# Patient Record
Sex: FEMALE | Race: WHITE | Employment: OTHER | URBAN - METROPOLITAN AREA
[De-identification: names, ages, dates, MRNs, and addresses within clinical notes are randomized per-mention and may not be internally consistent; named-entity substitution may affect disease eponyms.]

---

## 2017-12-14 PROBLEM — N39.41 URGE INCONTINENCE: Status: ACTIVE | Noted: 2017-12-14

## 2019-08-21 ENCOUNTER — OFFICE VISIT (OUTPATIENT)
Dept: HEMATOLOGY | Age: 39
End: 2019-08-21

## 2019-08-21 ENCOUNTER — HOSPITAL ENCOUNTER (OUTPATIENT)
Dept: LAB | Age: 39
Discharge: HOME | End: 2019-08-21
Payer: MEDICARE

## 2019-08-21 VITALS
OXYGEN SATURATION: 95 % | HEIGHT: 64 IN | WEIGHT: 200.13 LBS | HEART RATE: 97 BPM | DIASTOLIC BLOOD PRESSURE: 82 MMHG | TEMPERATURE: 98.4 F | BODY MASS INDEX: 34.17 KG/M2 | SYSTOLIC BLOOD PRESSURE: 116 MMHG

## 2019-08-21 DIAGNOSIS — R74.8 ELEVATED LIVER ENZYMES: ICD-10-CM

## 2019-08-21 DIAGNOSIS — R74.8 ELEVATED LIVER ENZYMES: Primary | ICD-10-CM

## 2019-08-21 DIAGNOSIS — Z11.59 ENCOUNTER FOR SCREENING FOR OTHER VIRAL DISEASES: ICD-10-CM

## 2019-08-21 PROBLEM — Z90.49 S/P CHOLECYSTECTOMY: Status: ACTIVE | Noted: 2019-08-21

## 2019-08-21 PROBLEM — E03.9 ACQUIRED HYPOTHYROIDISM: Status: ACTIVE | Noted: 2019-08-21

## 2019-08-21 PROBLEM — F31.9 BIPOLAR DISORDER (HCC): Status: ACTIVE | Noted: 2019-08-21

## 2019-08-21 PROBLEM — J45.909 ASTHMA: Status: ACTIVE | Noted: 2019-08-21

## 2019-08-21 PROBLEM — K31.84 GASTROPARESIS: Status: ACTIVE | Noted: 2019-08-21

## 2019-08-21 PROBLEM — E78.5 DYSLIPIDEMIA: Status: ACTIVE | Noted: 2019-08-21

## 2019-08-21 PROBLEM — E11.9 DM TYPE 2 (DIABETES MELLITUS, TYPE 2) (HCC): Status: ACTIVE | Noted: 2019-08-21

## 2019-08-21 LAB
ALBUMIN SERPL-MCNC: 4 G/DL (ref 3.4–5)
ALBUMIN/GLOB SERPL: 1.2 {RATIO} (ref 0.8–1.7)
ALP SERPL-CCNC: 64 U/L (ref 45–117)
ALT SERPL-CCNC: 30 U/L (ref 13–56)
ANION GAP SERPL CALC-SCNC: 10 MMOL/L (ref 3–18)
AST SERPL-CCNC: 16 U/L (ref 10–38)
BASOPHILS # BLD: 0 K/UL (ref 0–0.1)
BASOPHILS NFR BLD: 1 % (ref 0–2)
BILIRUB DIRECT SERPL-MCNC: 0.1 MG/DL (ref 0–0.2)
BILIRUB SERPL-MCNC: 0.3 MG/DL (ref 0.2–1)
BUN SERPL-MCNC: 15 MG/DL (ref 7–18)
BUN/CREAT SERPL: 20 (ref 12–20)
CALCIUM SERPL-MCNC: 9.2 MG/DL (ref 8.5–10.1)
CHLORIDE SERPL-SCNC: 106 MMOL/L (ref 100–111)
CO2 SERPL-SCNC: 27 MMOL/L (ref 21–32)
CREAT SERPL-MCNC: 0.75 MG/DL (ref 0.6–1.3)
DIFFERENTIAL METHOD BLD: NORMAL
EOSINOPHIL # BLD: 0.2 K/UL (ref 0–0.4)
EOSINOPHIL NFR BLD: 2 % (ref 0–5)
ERYTHROCYTE [DISTWIDTH] IN BLOOD BY AUTOMATED COUNT: 13.6 % (ref 11.6–14.5)
FERRITIN SERPL-MCNC: 54 NG/ML (ref 8–388)
GLOBULIN SER CALC-MCNC: 3.4 G/DL (ref 2–4)
GLUCOSE SERPL-MCNC: 79 MG/DL (ref 74–99)
HCT VFR BLD AUTO: 42.8 % (ref 35–45)
HGB BLD-MCNC: 14.3 G/DL (ref 12–16)
IRON SATN MFR SERPL: 10 %
IRON SERPL-MCNC: 48 UG/DL (ref 50–175)
LYMPHOCYTES # BLD: 2.3 K/UL (ref 0.9–3.6)
LYMPHOCYTES NFR BLD: 30 % (ref 21–52)
MCH RBC QN AUTO: 30.3 PG (ref 24–34)
MCHC RBC AUTO-ENTMCNC: 33.4 G/DL (ref 31–37)
MCV RBC AUTO: 90.7 FL (ref 74–97)
MONOCYTES # BLD: 0.7 K/UL (ref 0.05–1.2)
MONOCYTES NFR BLD: 9 % (ref 3–10)
NEUTS SEG # BLD: 4.4 K/UL (ref 1.8–8)
NEUTS SEG NFR BLD: 58 % (ref 40–73)
PLATELET # BLD AUTO: 364 K/UL (ref 135–420)
PMV BLD AUTO: 10.6 FL (ref 9.2–11.8)
POTASSIUM SERPL-SCNC: 4.3 MMOL/L (ref 3.5–5.5)
PROT SERPL-MCNC: 7.4 G/DL (ref 6.4–8.2)
RBC # BLD AUTO: 4.72 M/UL (ref 4.2–5.3)
SODIUM SERPL-SCNC: 143 MMOL/L (ref 136–145)
TIBC SERPL-MCNC: 470 UG/DL (ref 250–450)
WBC # BLD AUTO: 7.6 K/UL (ref 4.6–13.2)

## 2019-08-21 PROCEDURE — 80076 HEPATIC FUNCTION PANEL: CPT

## 2019-08-21 PROCEDURE — 85025 COMPLETE CBC W/AUTO DIFF WBC: CPT

## 2019-08-21 PROCEDURE — 83540 ASSAY OF IRON: CPT

## 2019-08-21 PROCEDURE — 82390 ASSAY OF CERULOPLASMIN: CPT

## 2019-08-21 PROCEDURE — 36415 COLL VENOUS BLD VENIPUNCTURE: CPT

## 2019-08-21 PROCEDURE — 86256 FLUORESCENT ANTIBODY TITER: CPT

## 2019-08-21 PROCEDURE — 83516 IMMUNOASSAY NONANTIBODY: CPT

## 2019-08-21 PROCEDURE — 80048 BASIC METABOLIC PNL TOTAL CA: CPT

## 2019-08-21 PROCEDURE — 82728 ASSAY OF FERRITIN: CPT

## 2019-08-21 PROCEDURE — 86704 HEP B CORE ANTIBODY TOTAL: CPT

## 2019-08-21 PROCEDURE — 82103 ALPHA-1-ANTITRYPSIN TOTAL: CPT

## 2019-08-21 PROCEDURE — 86038 ANTINUCLEAR ANTIBODIES: CPT

## 2019-08-21 PROCEDURE — 87340 HEPATITIS B SURFACE AG IA: CPT

## 2019-08-21 NOTE — Clinical Note
9/14/19 Patient: Priti Anton YOB: 1980 Date of Visit: 8/21/2019 VANESSA Perry 
[place] [address] VIA Facsimile: [phone] Dear VANESSA Perry, Thank you for referring Ms. Claribel Alvarado to [address] for evaluation. My notes for this consultation are attached. If you have questions, please do not hesitate to call me. I look forward to following your patient along with you. Sincerely, Marcos Lucio MD

## 2019-08-23 LAB
A1AT SERPL-MCNC: 129 MG/DL (ref 90–200)
ACTIN IGG SERPL-ACNC: 8 UNITS (ref 0–19)
ANA TITR SER IF: NEGATIVE {TITER}
C-ANCA TITR SER IF: NORMAL TITER
CERULOPLASMIN SERPL-MCNC: 34.2 MG/DL (ref 19–39)
HBV CORE AB SERPL QL IA: NEGATIVE
HBV SURFACE AG SER QL: 0.2 INDEX
HBV SURFACE AG SER QL: NEGATIVE
MITOCHONDRIA M2 IGG SER-ACNC: <20 UNITS (ref 0–20)
P-ANCA ATYPICAL TITR SER IF: NORMAL TITER
P-ANCA TITR SER IF: NORMAL TITER

## 2019-09-09 PROBLEM — N32.89 BLADDER WALL THICKENING: Status: ACTIVE | Noted: 2019-09-09

## 2019-09-19 ENCOUNTER — HOSPITAL ENCOUNTER (OUTPATIENT)
Dept: ULTRASOUND IMAGING | Age: 39
Discharge: HOME | End: 2019-09-19
Payer: MEDICARE

## 2019-09-19 DIAGNOSIS — R74.8 ELEVATED LIVER ENZYMES: ICD-10-CM

## 2019-09-19 PROCEDURE — 76981 USE PARENCHYMA: CPT

## 2019-10-09 ENCOUNTER — HOSPITAL ENCOUNTER (OUTPATIENT)
Age: 39
Setting detail: OUTPATIENT SURGERY
Discharge: HOME | End: 2019-10-09
Attending: INTERNAL MEDICINE | Admitting: INTERNAL MEDICINE
Payer: MEDICARE

## 2019-10-09 ENCOUNTER — HOSPITAL ENCOUNTER (OUTPATIENT)
Dept: ULTRASOUND IMAGING | Age: 39
Discharge: HOME | End: 2019-10-09
Attending: INTERNAL MEDICINE
Payer: MEDICARE

## 2019-10-09 VITALS
HEART RATE: 79 BPM | SYSTOLIC BLOOD PRESSURE: 114 MMHG | OXYGEN SATURATION: 97 % | RESPIRATION RATE: 16 BRPM | BODY MASS INDEX: 34.05 KG/M2 | DIASTOLIC BLOOD PRESSURE: 75 MMHG | TEMPERATURE: 96.6 F | HEIGHT: 64 IN | WEIGHT: 199.44 LBS

## 2019-10-09 DIAGNOSIS — R79.89 ELEVATED LFTS: ICD-10-CM

## 2019-10-09 PROCEDURE — 76705 ECHO EXAM OF ABDOMEN: CPT

## 2019-10-09 PROCEDURE — 77030013826 HC NDL BIOP MAXCOR BARD -B: Performed by: INTERNAL MEDICINE

## 2019-10-09 PROCEDURE — 88313 SPECIAL STAINS GROUP 2: CPT

## 2019-10-09 PROCEDURE — 74011000250 HC RX REV CODE- 250: Performed by: INTERNAL MEDICINE

## 2019-10-09 PROCEDURE — 88307 TISSUE EXAM BY PATHOLOGIST: CPT

## 2019-10-09 PROCEDURE — 76040000019: Performed by: INTERNAL MEDICINE

## 2019-10-09 PROCEDURE — 77030018836 HC SOL IRR NACL ICUM -A: Performed by: INTERNAL MEDICINE

## 2019-10-09 PROCEDURE — 74011250636 HC RX REV CODE- 250/636: Performed by: INTERNAL MEDICINE

## 2019-10-09 RX ORDER — ONDANSETRON 2 MG/ML
4 INJECTION INTRAMUSCULAR; INTRAVENOUS
Status: DISCONTINUED | OUTPATIENT
Start: 2019-10-09 | End: 2019-10-09 | Stop reason: HOSPADM

## 2019-10-09 RX ORDER — SODIUM CHLORIDE 0.9 % (FLUSH) 0.9 %
5-40 SYRINGE (ML) INJECTION EVERY 8 HOURS
Status: CANCELLED | OUTPATIENT
Start: 2019-10-09

## 2019-10-09 RX ORDER — LIDOCAINE HYDROCHLORIDE 10 MG/ML
10 INJECTION INFILTRATION; PERINEURAL ONCE
Status: COMPLETED | OUTPATIENT
Start: 2019-10-09 | End: 2019-10-09

## 2019-10-09 RX ORDER — HYDROMORPHONE HYDROCHLORIDE 2 MG/ML
1 INJECTION, SOLUTION INTRAMUSCULAR; INTRAVENOUS; SUBCUTANEOUS
Status: DISCONTINUED | OUTPATIENT
Start: 2019-10-09 | End: 2019-10-09 | Stop reason: HOSPADM

## 2019-10-09 RX ORDER — SODIUM CHLORIDE 0.9 % (FLUSH) 0.9 %
5-40 SYRINGE (ML) INJECTION AS NEEDED
Status: CANCELLED | OUTPATIENT
Start: 2019-10-09

## 2019-10-09 RX ADMIN — HYDROMORPHONE HYDROCHLORIDE 1 MG: 2 INJECTION INTRAMUSCULAR; INTRAVENOUS; SUBCUTANEOUS at 08:42

## 2019-10-09 RX ADMIN — ONDANSETRON 4 MG: 2 INJECTION INTRAMUSCULAR; INTRAVENOUS at 08:42

## 2019-10-09 NOTE — H&P
[facility], MD, 2022 [address], Cite Jason Jewell, MD Tor Urena PA-C [facility], ACNP-BC     Lisbeth HIDALGO Tita, [facility]   Emma Gnozalez P-C    Danilo Boland, [facility]       Alanamaryjane Gong [address]    at [address], [address] Masha Villarreal  22. [phone]    FAX: [address] [place] [address], [address]    [phone]    FAX: [phone]         PRE-PROCEDURE NOTE - LIVER BIOPSY    H and P from last office visit reviewed. Allergies reviewed. Out-patient medication list reviewed. Patient Active Problem List   Diagnosis Code    Urge incontinence N39.41    Acquired hypothyroidism E03.9    Elevated liver enzymes R74.8    DM type 2 (diabetes mellitus, type 2) (HCC) E11.9    Bipolar disorder (HCC) F31.9    Asthma J45.909    Gastroparesis K31.84    S/P cholecystectomy Z90.49    Dyslipidemia E78.5    Bladder wall thickening N32.89       Allergies   Allergen Reactions    Horseradish Swelling    Amoxicillin Hives    Tramadol Hives    Francine Plus Other (comments)     Kavitha driscoll    Gives her hallucination type feelings, feeling disconnected                                                                                                                                                                             Horseradish givers her mouth swelling    Bactrim [Sulfamethoprim] Hives    Gluten Nausea and Vomiting       No current facility-administered medications on file prior to encounter. Current Outpatient Medications on File Prior to Encounter   Medication Sig Dispense Refill    levocetirizine (XYZAL) 5 mg tablet Take 5 mg by mouth daily.       ziprasidone (GEODON) 20 mg capsule Take 20 mg by mouth daily.  eszopiclone (LUNESTA) 2 mg tablet 2 mg nightly.  prazosin (MINIPRESS) 1 mg capsule 1 mg.  fenofibrate nanocrystallized (TRICOR) 145 mg tablet Take 145 mg by mouth daily.  levothyroxine (SYNTHROID) 125 mcg tablet Take 125 mcg by mouth Daily (before breakfast).  pantoprazole (PROTONIX) 40 mg tablet 40 mg.      LORazepam (ATIVAN) 0.5 mg tablet 0.5 mg as needed.  pregabalin (LYRICA) 50 mg capsule 50 mg three (3) times daily.  meclizine (ANTIVERT) 25 mg tablet Take  by mouth three (3) times daily as needed.  OXcarbazepine (TRILEPTAL) 300 mg tablet Take 300 mg by mouth daily.  nystatin (MYCOSTATIN) topical cream Apply  to affected area.  SUMAtriptan (IMITREX) 100 mg tablet Take 100 mg by mouth once as needed for Migraine.  diclofenac (VOLTAREN) 1 % gel APPLY 2-4GMS EXTERNALLY 4 TIMES A DAY  0    promethazine (PHENERGAN) 25 mg tablet Take 25 mg by mouth every six (6) hours as needed for Nausea.  albuterol (PROAIR HFA) 90 mcg/actuation inhaler 180 mcg every six (6) hours as needed.  rizatriptan (MAXALT) 5 mg tablet 5 mg as needed.  ondansetron hcl (ZOFRAN) 4 mg tablet Take 4 mg by mouth every eight (8) hours as needed for Nausea. For liver biopsy to assess NALFD. The risks of the procedure were discussed with the patient. This included bleeding, pain, and puncture of other organs. All questions were answered. The patient wishes to proceed with the procedure. PHYSICAL EXAMINATION:  VS: per nursing note  General: No acute distress. Eyes: Sclera anicteric. ENT: No oral lesions. Thyroid normal.  Nodes: No adenopathy. Skin: No spider angiomata. No jaundice. No palmar erythema. Respiratory: Lungs clear to auscultation. Cardiovascular: Regular heart rate. No murmurs. No JVD. Abdomen: Soft non-tender, liver size normal to percussion/palpation. Spleen not palpable. No obvious ascites.   Extremities: No edema. No muscle wasting. No gross arthritic changes. Neurologic: Alert and oriented. Cranial nerves grossly intact. No asterixis. LABS:  Lab Results   Component Value Date/Time    WBC 7.6 08/21/2019 05:34 PM    HGB 14.3 08/21/2019 05:34 PM    HCT 42.8 08/21/2019 05:34 PM    PLATELET 369 45/73/9613 05:34 PM    MCV 90.7 08/21/2019 05:34 PM     No results found for: INR, PTMR, PTP, PT1, PT2, INREXT    ASSESSMENT AND PLAN:  Liver biopsy under ultrasound guidance.     Cesar Gonzalez MD  [address]  4 [facility], [address] [place] [address], [address]  12 [facility]

## 2019-10-09 NOTE — PROCEDURES
[facility], MD, [address], Hudson Dubose MD Lavone Parkinson, PA-C Jeremiah Bolds, [facility]-BC     Lisbeth Rivers, [facility]   NAVIN Simon, [facility]       Alana Gong [facility] 136    at [address], [facility] Masha Villarreal  22.    [phone]    FAX: [address] [place] [address], [address]    [phone]    FAX: [phone]         LIVER BIOPSY PROCEDURE NOTE    Orlin Damonselina  1980    INDICATIONS/PRE-OPERATIVE  DIAGNOSIS:  NAFLD    : oRz Moctezuma MD    SEDATION: 1% Lidocaine injection 20 ml    PROCEDURE:  Informed consent to perform the procedure was obtained from the patient. The patient was positioned on the edge of the stretcher lying flat in the supine position. Ultrasound was utilized to image the liver. The diaphragm and any major mass lesion or vascular structures within the liver were identified. An appropriate site for liver biopsy was identified. The distance from the surface of the skin to the liver capsule was 4 cm. This area was prepped with betadine and draped in sterile fashion. The skin was infiltrated with 1% lidocaine. The deeper subcutanous tissues and liver capsule overlying the biopsy site were then infiltrated with 1% lidocaine until appropriate anesthesia was obtained. A small incision was made in the skin so the biopsy devise could be easily inserted. A total of 3 passes with the 16 gauge Bard biopsy devise was then made into the liver. Core(s) of liver tissue totaling 3 cm in length were obtained and placed into tissue fixative. A band aid was placed over the biopsy site.   The patient was then repositioned on the right side and transported to the recovery area on the stretcher for routine monitoring until discharge. The specimen was sent to pathology for processing via the normal transport mechanism. SPECIMEN REMOVED: Liver    ESTIMATED BLOOD LOSS: Negligible.       POST-OPERATIVE DIAGNOSIS: Same as Pre-operative Diagnosis    Karen Kapoor MD  [address]  [address] [place] [address] 58, [address]  [address] [place] [address]

## 2019-10-09 NOTE — DISCHARGE INSTRUCTIONS
[facility], Christian YOUSIF Cite Mongi Slim, Noel Kraft, MD Deirdre Boot, DAVID Patel, [facility]     April S Tita, [facility]   Cary Peres [facility]-JEVON Rubin, [facility]       [facility] 136    at [facility]    [address] [facility], [address] [place]    [address] [facility] 22.    [phone]    FAX: [address], [address]    [phone]    FAX: [phone]         LIVER BIOPSY DISCHARGE INSTRUCTIONS      Madiha Inocente  1980  Date: 10/9/2019    DIET:    You may resume your previous diet. ACTIVITIES:  Rest quietly the rest of today. You should not lift any objects more than 20 pounds for the next 2 days. If you work sitting down without strenuous activity you may return to work tomorrow. If you exert yourself or do heavy lifting at work you should take tomorrow off. Do not drive or operate hazardous machinery for 12 hours after you are discharged from this procedure. SPECIAL INSTRUCTIONS:  Do not use any aspirin or non-steroidal (Motin, Advil, Naproxen, etc) pain medications for the next 2 days. You may use extra-strength Tylenol (acetaminophen) if you experience pain or discomfort later today. Restarting blood thinners: If you were taking blood thinners prior to the procedure you can restart these in 2 days. Call the [facility] [address] office if you experience any of the following:  Persistent or severe abdominal pain. Persistent or severe abdominal distention. Fever and chills   Nausea and vomiting. New or unusual symptoms. Follow-up care: You should have a follow up appointment with Dr. Katheryn Ramon to review the results of the liver biopsy results in 2 weeks.   If you do not have an appointment please call the office at the number listed above to schedule this. Other instructions: If you have any problems or questions call the [address] office at the phone number listed above. DISCHARGE SUMMARY from Nurse: The following personal items collected during your admission are returned to you:   Dental Appliance: Dental Appliances: None  Vision: Visual Aid: None  Hearing Aid:    Jewelry:    Clothing:    Other Valuables:    Valuables sent to safe:        DISCHARGE SUMMARY from Nurse    PATIENT INSTRUCTIONS:    After general anesthesia or intravenous sedation, for 24 hours or while taking prescription Narcotics:  · Limit your activities  · Do not drive and operate hazardous machinery  · Do not make important personal or business decisions  · Do  not drink alcoholic beverages  · If you have not urinated within 8 hours after discharge, please contact your surgeon on call. Report the following to your surgeon:  · Excessive pain, swelling, redness or odor of or around the surgical area  · Temperature over 100.5  · Nausea and vomiting lasting longer than 4 hours or if unable to take medications  · Any signs of decreased circulation or nerve impairment to extremity: change in color, persistent  numbness, tingling, coldness or increase pain  · Any questions    What to do at Home:  Recommended activity: as above,     If you experience any of the following symptoms as above, please follow up with Dr. Tashi Carter. *  Please give a list of your current medications to your Primary Care Provider. *  Please update this list whenever your medications are discontinued, doses are      changed, or new medications (including over-the-counter products) are added. *  Please carry medication information at all times in case of emergency situations.     These are general instructions for a healthy lifestyle:    No smoking/ No tobacco products/ Avoid exposure to second hand smoke  Surgeon General's Warning:  Quitting smoking now greatly reduces serious risk to your health. Obesity, smoking, and sedentary lifestyle greatly increases your risk for illness    A healthy diet, regular physical exercise & weight monitoring are important for maintaining a healthy lifestyle    You may be retaining fluid if you have a history of heart failure or if you experience any of the following symptoms:  Weight gain of 3 pounds or more overnight or 5 pounds in a week, increased swelling in our hands or feet or shortness of breath while lying flat in bed. Please call your doctor as soon as you notice any of these symptoms; do not wait until your next office visit. The discharge information has been reviewed with the patient. The patient verbalized understanding. Discharge medications reviewed with the patient and appropriate educational materials and side effects teaching were provided.   ___________________________________________________________________________________________________________________________________  Patient armband removed and shredded

## 2019-10-23 ENCOUNTER — OFFICE VISIT (OUTPATIENT)
Dept: HEMATOLOGY | Age: 39
End: 2019-10-23

## 2019-10-23 VITALS
OXYGEN SATURATION: 96 % | BODY MASS INDEX: 33.12 KG/M2 | HEIGHT: 64 IN | HEART RATE: 95 BPM | DIASTOLIC BLOOD PRESSURE: 78 MMHG | TEMPERATURE: 98.3 F | SYSTOLIC BLOOD PRESSURE: 110 MMHG | WEIGHT: 194 LBS

## 2019-10-23 DIAGNOSIS — K76.0 NAFL (NONALCOHOLIC FATTY LIVER): Primary | ICD-10-CM

## 2019-10-23 NOTE — Clinical Note
12/25/19 Patient: Nicho Mcpherson YOB: 1980 Date of Visit: 10/23/2019 VANESSA Sarah 
[place] [address] VIA Facsimile: [phone] Dear VANESSA Sarah, Thank you for referring Ms. Nica Pritchett to [address] [facility] for evaluation. My notes for this consultation are attached. If you have questions, please do not hesitate to call me. I look forward to following your patient along with you. Sincerely, Chelsea Lovett MD

## 2019-12-25 PROBLEM — K76.0 NAFL (NONALCOHOLIC FATTY LIVER): Status: ACTIVE | Noted: 2019-12-25

## 2019-12-26 NOTE — PROGRESS NOTES
[address] [place] [address], Jaron YOUSIF Ninette Ali, MD Joneen Cheers, DAVID Kauffman, [facility]     Lisbeth Rivers, [facility]   [address] FNP-JEVON Alonzo, [facility]       Alana Deputado [address]    at [address], [address] [place] [address], [facility] 22.    [phone]    FAX: [address] [place] [address]    at [address], [address]    [phone]    FAX: [phone]       Patient Care Team:  VANESSA Molina as PCP - General (Nurse Practitioner)  Marlee Garcia MD (Gastroenterology)      Problem List  Date Reviewed: 9/14/2019          Codes Class Noted    NAFL (nonalcoholic fatty liver) URJ-29-WZ: K76.0  ICD-9-CM: 571.8  12/25/2019        Bladder wall thickening ICD-10-CM: N32.89  ICD-9-CM: 596.89  9/9/2019        Acquired hypothyroidism ICD-10-CM: E03.9  ICD-9-CM: 244.9  8/21/2019        Elevated liver enzymes ICD-10-CM: R74.8  ICD-9-CM: 790.5  8/21/2019        DM type 2 (diabetes mellitus, type 2) ([facility] 75.) ICD-10-CM: E11.9  ICD-9-CM: 250.00  8/21/2019        Bipolar disorder ([facility] 75.) ICD-10-CM: F31.9  ICD-9-CM: 296.80  8/21/2019        Asthma ICD-10-CM: J45.909  ICD-9-CM: 493.90  8/21/2019        Gastroparesis ICD-10-CM: K31.84  ICD-9-CM: 536.3  8/21/2019        S/P cholecystectomy ICD-10-CM: Z90.49  ICD-9-CM: V45.79  8/21/2019        Dyslipidemia ICD-10-CM: E78.5  ICD-9-CM: 272.4  8/21/2019        Urge incontinence ICD-10-CM: N39.41  ICD-9-CM: 788.31  12/14/2017              [facility] management of non-alcoholic fatty liver (NAFL).  The active problem list, all pertinent past medical history, medications, liver histology, radiologic findings and laboratory findings related to the liver disorder were reviewed with the patient. The patient is a 35 y.o.  female who was found to have elevated liver enzymes in 2015 after hospital admission for possible gallstone pancreatitis. The patient reports that her liver enzymes returned to normal after after she underwent cholecystectomy. The patient was again noted to have elevated liver enzymes in 5/2019 during a visit to the ER. Serologic evaluation for markers of chronic liver disease was negative     Ultrasound of the liver performed in 5/2019 demonstrated increased hepatic echogenicity which may suggest steatosis. A liver wedge biopsy performed during lap cholecystectomy in 9/2015 demonstrated moderate to severe steatosis and early periportal and bridging fibrosis. The patient underwent a liver biopsy in 10/2019. The procedure was well tolerated. I have personally reviewed the liver biopsy slides. This demonstrates macrovesicular steatosis with no inflammation and no fibrosis. The patient notes fatigues    The patient has not experienced the following symptoms: fatigue, pain in the right side over the liver,    yellowing of the eyes or skin, problems concentrating,     The patient completes all daily activities without any functional limitations. ASSESSMENT AND PLAN:  Benign steatosis/NAFL  The diagnosis is based upon liver biopsy, features of metabolic syndrome, serologic studies that are negative for other causes of chronic liver disease,   A liver biopsy performed in 10/2019 shows macrovesicular steatosis with no inflammation and no fibrosis. NAFL is a benign form of fatty liver disease and not thought to progress to fibrosis or cirrhosis. Liver transaminases are normal.  ALP is normal.  Liver function is normal.  The platelet count is normal.      If the patient looses 20% of current body weight, which is 38 pounds, down to a weight of of 150 pounds, all steatosis will have resolved. Once all steatosis has resolved all inflammation will resolve. Then all fibrosis will gradually resolve and the liver could eventually be normal.    Counseling for diet and weight loss in patients with confirmed or suspected NAFLD  The patient was counseled regarding diet and exercise to achieve weight loss. The best diet for patients with fatty liver is one very low in carbohydrates and enriched with protein such as an Jonathan's program.      The patient was told not to consume any food products and drinks containing fructose as this enhances hepatic fat synthesis. There is no medication or vitamin supplements that we advocate for DOMINGUEZ. Using glitazones in patients without diabetes mellitus has been shown to reduce fat content in the liver but has no effect on fibrosis and is associated with weight gain. Vitamin E has also been used but the data is not very good and most experts no longer advocate this. Screening for Hepatocellular Carcinoma  HCC screening is not necessary since the patient has no evidence of cirrhosis. Treatment of other medical problems in patients with chronic liver disease  There are no contraindications for the patient to take most medications that are necessary for treatment of other medical issues. Counseling for alcohol in patients with chronic liver disease  The patient was counseled regarding alcohol consumption and the effect of alcohol on chronic liver disease. The patient does not consume any significant amount of alcohol. Vaccinations   The need for vaccination against viral hepatitis A and B will be assessed with serologic and instituted as appropriate. Routine vaccinations against other bacterial and viral agents can be performed as indicated. Annual flu vaccination should be administered if indicated.       ALLERGIES  Allergies   Allergen Reactions    Horseradish Swelling    Amoxicillin Hives    Tramadol Hives    Kavitha-[place] Plus Other (comments) Kavitha driscoll    Gives her hallucination type feelings, feeling disconnected                                                                                                                                                                             Horseradish givers her mouth swelling    Bactrim [Sulfamethoprim] Hives    Gluten Nausea and Vomiting       MEDICATIONS  Current Outpatient Medications   Medication Sig    nystatin (MYCOSTATIN) topical cream Apply  to affected area.  levocetirizine (XYZAL) 5 mg tablet Take 5 mg by mouth daily.  ziprasidone (GEODON) 20 mg capsule Take 20 mg by mouth daily.  SUMAtriptan (IMITREX) 100 mg tablet Take 100 mg by mouth once as needed for Migraine.  diclofenac (VOLTAREN) 1 % gel APPLY 2-4GMS EXTERNALLY 4 TIMES A DAY    promethazine (PHENERGAN) 25 mg tablet Take 25 mg by mouth every six (6) hours as needed for Nausea.  eszopiclone (LUNESTA) 2 mg tablet 2 mg nightly.  prazosin (MINIPRESS) 1 mg capsule 1 mg.  fenofibrate nanocrystallized (TRICOR) 145 mg tablet Take 145 mg by mouth daily.  albuterol (PROAIR HFA) 90 mcg/actuation inhaler 180 mcg every six (6) hours as needed.  levothyroxine (SYNTHROID) 125 mcg tablet Take 125 mcg by mouth Daily (before breakfast).  pantoprazole (PROTONIX) 40 mg tablet 40 mg.    LORazepam (ATIVAN) 0.5 mg tablet 0.5 mg as needed.  rizatriptan (MAXALT) 5 mg tablet 5 mg as needed.  pregabalin (LYRICA) 50 mg capsule 50 mg three (3) times daily.  ondansetron hcl (ZOFRAN) 4 mg tablet Take 4 mg by mouth every eight (8) hours as needed for Nausea.  meclizine (ANTIVERT) 25 mg tablet Take  by mouth three (3) times daily as needed.  OXcarbazepine (TRILEPTAL) 300 mg tablet Take 300 mg by mouth daily. No current facility-administered medications for this visit. SYSTEM REVIEW NOT RELATED TO LIVER DISEASE OR REVIEWED ABOVE:  Constitution systems: Negative for fever, chills, weight gain, weight loss.    Eyes: Negative for visual changes. ENT: Negative for sore throat, painful swallowing. Respiratory: Negative for cough, hemoptysis, SOB. Cardiology: Negative for chest pain, palpitations. GI:  Negative for constipation or diarrhea. : Negative for urinary frequency, dysuria, hematuria, nocturia. Skin: Negative for rash. Hematology: Negative for easy bruising, blood clots. Musculo-skelatal: Negative for back pain, muscle pain, weakness. Neurologic: Negative for headaches, dizziness, vertigo, memory problems not related to HE. Psychology: Negative for anxiety, depression. FAMILY HISTORY:  The patient has no knowledge of the father's medical condition. The mother is . There is no family history of liver disease. The following family members have immune disorders: Mother had RA. SOCIAL HISTORY:  The patient is . The patient has 1 child. The patient currently smokes 1/2- 1  pack of tobacco daily. The patient very rarely drink alcohol. The patient has previously consumed alcohol in excess. The patient currently works part time in [employer] division of [employer]. The patient is currently receiving disability. PHYSICAL EXAMINATION:  Visit Vitals  /78   Pulse 95   Temp 98.3 °F (36.8 °C) (Tympanic)   Ht 5' 4\" (1.626 m)   Wt 194 lb (88 kg)   SpO2 96%   BMI 33.30 kg/m²     General: No acute distress. Eyes: Sclera anicteric. ENT: No oral lesions. Thyroid normal.  Nodes: No adenopathy. Skin: No spider angiomata. No jaundice. No palmar erythema. Respiratory: Lungs clear to auscultation. Cardiovascular: Regular heart rate. No murmurs. No JVD. Abdomen: Soft non-tender. Liver size normal to percussion/palpation. Spleen not palpable. No obvious ascites. Extremities: No edema. No muscle wasting. No gross arthritic changes. Neurologic: Alert and oriented. Cranial nerves grossly intact. No asterixis.     LABORATORY STUDIES:  From 5/2019  AST/ALT/ALP/T Bili/ALB: 374/244/67/0.9/4.2  WBC/HB/PLT/INR: 4.7/14.7/345/    Liver [place] of [address] Units 8/21/2019   WBC 4.6 - 13.2 K/uL 7.6   ANC 1.8 - 8.0 K/UL 4.4   HGB 12.0 - 16.0 g/dL 14.3    - 420 K/uL 364   AST 10 - 38 U/L 16   ALT 13 - 56 U/L 30   Alk Phos 45 - 117 U/L 64   Bili, Total 0.2 - 1.0 MG/DL 0.3   Bili, Direct 0.0 - 0.2 MG/DL 0.1   Albumin 3.4 - 5.0 g/dL 4.0   BUN 7.0 - 18 MG/DL 15   Creat 0.6 - 1.3 MG/DL 0.75   Na 136 - 145 mmol/L 143   K 3.5 - 5.5 mmol/L 4.3   Cl 100 - 111 mmol/L 106   CO2 21 - 32 mmol/L 27   Glucose 74 - 99 mg/dL 79     SEROLOGIES:  5/2019. HBsAntigen negative, anti-HCV negative,     Serologies Latest Ref Rng & Units 8/21/2019   Hep B Surface Ag <1.00 Index 0.2   Hep B Surface Ag Interp NEG   NEGATIVE   Hep B Core Ab, Total NEGATIVE   NEGATIVE   Ferritin 8 - 388 NG/ML 54   Iron % Saturation % 10   JOURDAN, IFA  NEGATIVE   C-ANCA Neg:<1:20 titer <1:20   P-ANCA Neg:<1:20 titer <1:20   ANCA Neg:<1:20 titer <1:20   ASMCA 0 - 19 Units 8   M2 Ab 0.0 - 20.0 Units <20.0   Ceruloplasmin 19.0 - 39.0 mg/dL 34.2   Alpha-1 antitrypsin level 90 - 200 mg/dL 129     LIVER HISTOLOGY:  10/2019. Slides reviewed by BUD. KATIE. 66-75% mostly macrovesicualr and micovesicular steatosis, No inflammation, No ballooning, Stage No fibrosis. HOLLEY (300). ENDOSCOPIC PROCEDURES:  Not available or performed    RADIOLOGY:  5/2019: Liver US: increased hepatic echogenicity may suggest hepatic steatosis. OTHER TESTING:  Not available or performed    FOLLOW-UP:  All of the issues listed above in the Assessment and Plan were discussed with the patient. All questions were answered. The patient expressed a clear understanding of the above. [address] [place] [address] in 4 months for Fibroscan for routine monitoring.       MD [facility] 13 of [address] [place], [place] [address]delon Trevizoly [address] Mel

## 2022-03-18 PROBLEM — E03.9 ACQUIRED HYPOTHYROIDISM: Status: ACTIVE | Noted: 2019-08-21

## 2022-03-19 PROBLEM — J45.909 ASTHMA: Status: ACTIVE | Noted: 2019-08-21

## 2022-03-19 PROBLEM — K31.84 GASTROPARESIS: Status: ACTIVE | Noted: 2019-08-21

## 2022-03-19 PROBLEM — E78.5 DYSLIPIDEMIA: Status: ACTIVE | Noted: 2019-08-21

## 2022-03-19 PROBLEM — R74.8 ELEVATED LIVER ENZYMES: Status: ACTIVE | Noted: 2019-08-21

## 2022-03-19 PROBLEM — F31.9 BIPOLAR DISORDER (HCC): Status: ACTIVE | Noted: 2019-08-21

## 2022-03-19 PROBLEM — E11.9 DM TYPE 2 (DIABETES MELLITUS, TYPE 2) (HCC): Status: ACTIVE | Noted: 2019-08-21

## 2022-03-19 PROBLEM — Z90.49 S/P CHOLECYSTECTOMY: Status: ACTIVE | Noted: 2019-08-21

## 2022-03-19 PROBLEM — N32.89 BLADDER WALL THICKENING: Status: ACTIVE | Noted: 2019-09-09

## 2022-03-20 PROBLEM — N39.41 URGE INCONTINENCE: Status: ACTIVE | Noted: 2017-12-14

## 2022-03-20 PROBLEM — K76.0 NAFL (NONALCOHOLIC FATTY LIVER): Status: ACTIVE | Noted: 2019-12-25

## 2022-06-15 ENCOUNTER — OFFICE VISIT (OUTPATIENT)
Dept: HEMATOLOGY | Age: 42
End: 2022-06-15
Payer: MEDICARE

## 2022-06-15 VITALS
TEMPERATURE: 97.7 F | SYSTOLIC BLOOD PRESSURE: 106 MMHG | BODY MASS INDEX: 30.81 KG/M2 | HEIGHT: 64 IN | WEIGHT: 180.5 LBS | OXYGEN SATURATION: 98 % | DIASTOLIC BLOOD PRESSURE: 72 MMHG | HEART RATE: 90 BPM

## 2022-06-15 DIAGNOSIS — K76.0 NAFL (NONALCOHOLIC FATTY LIVER): Primary | ICD-10-CM

## 2022-06-15 PROBLEM — R74.8 ELEVATED LIVER ENZYMES: Status: RESOLVED | Noted: 2019-08-21 | Resolved: 2022-06-15

## 2022-06-15 PROCEDURE — 91200 LIVER ELASTOGRAPHY: CPT | Performed by: INTERNAL MEDICINE

## 2022-06-15 PROCEDURE — 99214 OFFICE O/P EST MOD 30 MIN: CPT | Performed by: INTERNAL MEDICINE

## 2022-06-15 RX ORDER — METFORMIN HYDROCHLORIDE 500 MG/1
500 TABLET ORAL 2 TIMES DAILY WITH MEALS
COMMUNITY

## 2022-06-15 NOTE — Clinical Note
6/30/2022    Patient: Celeste Cuellar   YOB: 1980   Date of Visit: 6/15/2022     Darya Lakhani NP  Via     Dear Darya Lakhani NP,      Thank you for referring Ms. Feilx Brewster to [address] [place] [address],[address] for evaluation. My notes for this consultation are attached. If you have questions, please do not hesitate to call me. I look forward to following your patient along with you.       Sincerely,    Mary Epps MD

## 2022-06-15 NOTE — PROGRESS NOTES
[address] [place] [address], Jenny YOUSIF, MD Renae Cruz, PA-C    Richard King, ACNBC     Lisbeth Rivers, [facility]   Parish Monge FNP-JEVON Renee, [facility]       Alana Arauz Rai 136    at [address], [address] [place] [address], [facility] 22.    [phone]    FAX: [address] [place] [address], [address]    [phone]    FAX: [phone]       Patient Care Team:  Elder Talbot NP as PCP - General (Nurse Practitioner)  Lorena Blood MD (Gastroenterology)      Problem List  Date Reviewed: 12/25/2019          Codes Class Noted    NAFL (nonalcoholic fatty liver) PAW-[id number]: K76.0  ICD-9-CM: 571.8  12/25/2019        Bladder wall thickening ICD-10-CM: N32.89  ICD-9-CM: 596.89  9/9/2019        Acquired hypothyroidism ICD-10-CM: E03.9  ICD-9-CM: 244.9  8/21/2019        Elevated liver enzymes ICD-10-CM: R74.8  ICD-9-CM: 790.5  8/21/2019        DM type 2 (diabetes mellitus, type 2) ([facility] ICD-10-CM: E11.9  ICD-9-CM: 250.00  8/21/2019        Bipolar disorder ([facility].) ICD-10-CM: F31.9  ICD-9-CM: 296.80  8/21/2019        Asthma ICD-10-CM: J45.909  ICD-9-CM: 493.90  8/21/2019        Gastroparesis ICD-10-CM: K31.84  ICD-9-CM: 536.3  8/21/2019        S/P cholecystectomy ICD-10-CM: Z90.49  ICD-9-CM: V45.79  8/21/2019        Dyslipidemia ICD-10-CM: E78.5  ICD-9-CM: 272.4  8/21/2019        Urge incontinence ICD-10-CM: N39.41  ICD-9-CM: 788.31  12/14/2017              Celeste Cuellar returns to the [address] for management of non-alcoholic fatty liver (NAFL).  The active problem list, all pertinent past medical history, medications, liver histology, radiologic findings and laboratory findings related to the liver disorder were reviewed with the patient. The patient is a 36 y.o.  female who was found to have intermittent elevation in liver enzymes in 2015     Serologic evaluation for markers of chronic liver disease was negative     Ultrasound of the liver performed in 5/2019 demonstrated increased hepatic echogenicity consistent with fatty liver,    A liver wedge biopsy performed during lap cholecystectomy in 9/2015 demonstrated moderate to severe steatosis and early periportal and bridging fibrosis. The patient underwent a liver biopsy in 10/2019. The procedure was well tolerated. I have personally reviewed the liver biopsy slides. This demonstrates macrovesicular steatosis with no inflammation and no fibrosis. Assessment of liver fibrosis with Fibroscan was performed in the office today. The result was 3.0 kPa which correlates with no fibrosis. The CAP score of 321 suggests hepatic steatosis. The patient notes fatigues    The patient has not experienced the following symptoms: fatigue, pain in the right side over the liver,    yellowing of the eyes or skin, problems concentrating,     The patient completes all daily activities without any functional limitations. 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 The specific patient was not identified. She is now recieving prophylaxis against HIV. ASSESSMENT AND PLAN:  Benign steatosis/NAFL  The diagnosis is based upon liver biopsy, Fibroscan, features of metabolic syndrome, serologic studies that are negative for other causes of chronic liver disease,     A liver biopsy performed in 10/2019 shows macrovesicular steatosis with no inflammation and no fibrosis. Fibroscan in 6/2021 was 3.0kPa and  suggesting fatty liver with no fibrosis. NAFL is a benign form of fatty liver disease and not thought to progress to fibrosis or cirrhosis.       Liver transaminases are normal.  ALP is normal.  Liver function is normal.  The platelet count is normal.      If the patient looses 20% of current body weight, which is 38 pounds, down to a weight of of 150 pounds, all steatosis will have resolved. Once all steatosis has resolved all inflammation will resolve. Then all fibrosis will gradually resolve and the liver could eventually be normal.    Counseling for diet and weight loss in patients with confirmed or suspected NAFLD  The patient was counseled regarding diet and exercise to achieve weight loss. The best diet for patients with fatty liver is one very low in carbohydrates and enriched with protein such as an Jonathan's program.      The patient was told not to consume any food products and drinks containing fructose as this enhances hepatic fat synthesis. There is no medication or vitamin supplements that we advocate for DOMINGUEZ. Using glitazones in patients without diabetes mellitus has been shown to reduce fat content in the liver but has no effect on fibrosis and is associated with weight gain. Vitamin E has also been used but the data is not very good and most experts no longer advocate this. Screening for Hepatocellular Carcinoma  HCC screening is not necessary since the patient has no evidence of cirrhosis. Treatment of other medical problems in patients with chronic liver disease  There are no contraindications for the patient to take most medications that are necessary for treatment of other medical issues. Counseling for alcohol in patients with chronic liver disease  The patient was counseled regarding alcohol consumption and the effect of alcohol on chronic liver disease. The patient does not consume any significant amount of alcohol. Vaccinations   The need for vaccination against viral hepatitis A and B will be assessed with serologic and instituted as appropriate.   Routine vaccinations against other bacterial and viral agents can be performed as indicated. Annual flu vaccination should be administered if indicated. ALLERGIES  Allergies   Allergen Reactions    Horseradish Swelling    Amoxicillin Hives    Tramadol Hives    Kavitha-Coldspring Plus Other (comments)     Kavitha driscoll    Gives her hallucination type feelings, feeling disconnected                                                                                                                                                                             Horseradish givers her mouth swelling    Bactrim [Sulfamethoprim] Hives    Barium Iodide Nausea Only    Clindamycin Other (comments)     GI issues stated pt.  Dextromethorphan-Guaifenesin Hives    Drug Class [Statins-Hmg-Coa Reductase Inhibitors] Other (comments)     DOMINGUEZ    Gluten Nausea and Vomiting    Levaquin [Levofloxacin] Other (comments)     Joint stiffness and pain    Sodium Citrate Dihydrate Other (comments)     Mental issues stated pt.  Sulfamethoxazole Hives    Sulfamethoxazole-Trimethoprim Hives       MEDICATIONS  Current Outpatient Medications   Medication Sig    metFORMIN (GLUCOPHAGE) 500 mg tablet Take 500 mg by mouth two (2) times daily (with meals).  levocetirizine (XYZAL) 5 mg tablet Take 5 mg by mouth daily.  diclofenac (VOLTAREN) 1 % gel APPLY 2-4GMS EXTERNALLY 4 TIMES A DAY    promethazine (PHENERGAN) 25 mg tablet Take 25 mg by mouth every six (6) hours as needed for Nausea.  fenofibrate nanocrystallized (TRICOR) 145 mg tablet Take 145 mg by mouth daily.  albuterol (PROAIR HFA) 90 mcg/actuation inhaler 180 mcg every six (6) hours as needed.  LORazepam (ATIVAN) 0.5 mg tablet 0.5 mg as needed.  ondansetron hcl (ZOFRAN) 4 mg tablet Take 4 mg by mouth every eight (8) hours as needed for Nausea.  meclizine (ANTIVERT) 25 mg tablet Take  by mouth three (3) times daily as needed.  nystatin (MYCOSTATIN) topical cream Apply  to affected area.     ziprasidone (GEODON) 20 mg capsule Take 20 mg by mouth daily.  SUMAtriptan (IMITREX) 100 mg tablet Take 100 mg by mouth once as needed for Migraine.  eszopiclone (LUNESTA) 2 mg tablet 2 mg nightly.  prazosin (MINIPRESS) 1 mg capsule 1 mg.  levothyroxine (SYNTHROID) 125 mcg tablet Take 125 mcg by mouth Daily (before breakfast).  pantoprazole (PROTONIX) 40 mg tablet 40 mg.    rizatriptan (MAXALT) 5 mg tablet 5 mg as needed.  pregabalin (LYRICA) 50 mg capsule 50 mg three (3) times daily.  OXcarbazepine (TRILEPTAL) 300 mg tablet Take 300 mg by mouth daily. No current facility-administered medications for this visit. SYSTEM REVIEW NOT RELATED TO LIVER DISEASE OR REVIEWED ABOVE:  Constitution systems: Negative for fever, chills, weight gain, weight loss. Eyes: Negative for visual changes. ENT: Negative for sore throat, painful swallowing. Respiratory: Negative for cough, hemoptysis, SOB. Cardiology: Negative for chest pain, palpitations. GI:  Negative for constipation or diarrhea. : Negative for urinary frequency, dysuria, hematuria, nocturia. Skin: Negative for rash. Hematology: Negative for easy bruising, blood clots. Musculo-skelatal: Negative for back pain, muscle pain, weakness. Neurologic: Negative for headaches, dizziness, vertigo, memory problems not related to HE. Psychology: Negative for anxiety, depression. FAMILY HISTORY:  The patient has no knowledge of the father's medical condition. The mother is . There is no family history of liver disease. The following family members have immune disorders: Mother had RA. SOCIAL HISTORY:  The patient is . The patient has 1 child. The patient currently smokes 1/2- 1  pack of tobacco daily. The patient very rarely drink alcohol. The patient has previously consumed alcohol in excess. The patient currently works part time in [employer] division of [employer]. The patient is currently receiving disability. PHYSICAL EXAMINATION:  Visit Vitals  /72   Pulse 90   Temp 97.7 °F (36.5 °C) (Tympanic)   Ht 5' 4\" (1.626 m)   Wt 180 lb 8 oz (81.9 kg)   SpO2 98%   BMI 30.98 kg/m²     General: No acute distress. Eyes: Sclera anicteric. ENT: No oral lesions. Thyroid normal.  Nodes: No adenopathy. Skin: No spider angiomata. No jaundice. No palmar erythema. Respiratory: Lungs clear to auscultation. Cardiovascular: Regular heart rate. No murmurs. No JVD. Abdomen: Soft non-tender. Liver size normal to percussion/palpation. Spleen not palpable. No obvious ascites. Extremities: No edema. No muscle wasting. No gross arthritic changes. Neurologic: Alert and oriented. Cranial nerves grossly intact. No asterixis. LABORATORY STUDIES:  From 6/2022  AST/ALT/ALP/T Bili/ALB:  17/18/68/0.2/5.0  WBC/HB/PLT/INR:  6.0/13.0/338  NA/BUN/CREAT:  21/0.7    SEROLOGIES:  5/2019. HBsAntigen negative, anti-HCV negative,   3/2020. Anti-HBsurface positive    Serologies Latest Ref Rng & Units 8/21/2019   Hep B Surface Ag <1.00 Index 0.2   Hep B Surface Ag Interp NEG   NEGATIVE   Hep B Core Ab, Total NEGATIVE   NEGATIVE   Ferritin 8 - 388 NG/ML 54   Iron % Saturation % 10   JOURDAN, IFA  NEGATIVE   C-ANCA Neg:<1:20 titer <1:20   P-ANCA Neg:<1:20 titer <1:20   ANCA Neg:<1:20 titer <1:20   ASMCA 0 - 19 Units 8   M2 Ab 0.0 - 20.0 Units <20.0   Ceruloplasmin 19.0 - 39.0 mg/dL 34.2   Alpha-1 antitrypsin level 90 - 200 mg/dL 129     LIVER HISTOLOGY:  10/2019. Slides reviewed by BUD. KATIE. 66-75% mostly macrovesicualr and micovesicular steatosis, No inflammation, No ballooning, Stage No fibrosis. HOLLEY (300). 6/2021. FibroScan performed at [address]. EkPa was 3.0. IQR/med 22%. . The results suggested a fibrosis level of F0. The CAP score suggests there is hepatic steatosis.       ENDOSCOPIC PROCEDURES:  Not available or performed    RADIOLOGY:  5/2019: Liver US: increased hepatic echogenicity may suggest hepatic steatosis. OTHER TESTING:  Not available or performed    FOLLOW-UP:  All of the issues listed above in the Assessment and Plan were discussed with the patient. All questions were answered. The patient expressed a clear understanding of the above. [address] [place] [address] in 6 months for monitoring.       Álvaro Kline MD  [address] [place] [address] 22.  [phone]  [address] [place] [address]

## 2022-12-19 ENCOUNTER — OFFICE VISIT (OUTPATIENT)
Dept: HEMATOLOGY | Age: 42
End: 2022-12-19
Payer: MEDICARE

## 2022-12-19 ENCOUNTER — HOSPITAL ENCOUNTER (OUTPATIENT)
Dept: LAB | Age: 42
Discharge: HOME | End: 2022-12-19
Payer: MEDICARE

## 2022-12-19 VITALS
WEIGHT: 172 LBS | HEART RATE: 77 BPM | BODY MASS INDEX: 29.37 KG/M2 | TEMPERATURE: 97.3 F | OXYGEN SATURATION: 98 % | SYSTOLIC BLOOD PRESSURE: 116 MMHG | HEIGHT: 64 IN | DIASTOLIC BLOOD PRESSURE: 79 MMHG

## 2022-12-19 DIAGNOSIS — K76.0 NAFL (NONALCOHOLIC FATTY LIVER): ICD-10-CM

## 2022-12-19 DIAGNOSIS — K76.0 NAFL (NONALCOHOLIC FATTY LIVER): Primary | ICD-10-CM

## 2022-12-19 LAB
ALBUMIN SERPL-MCNC: 4 G/DL (ref 3.4–5)
ALBUMIN/GLOB SERPL: 1.3 {RATIO} (ref 0.8–1.7)
ALP SERPL-CCNC: 70 U/L (ref 45–117)
ALT SERPL-CCNC: 22 U/L (ref 13–56)
ANION GAP SERPL CALC-SCNC: 8 MMOL/L (ref 3–18)
AST SERPL-CCNC: 14 U/L (ref 10–38)
BASOPHILS # BLD: 0.1 K/UL (ref 0–0.1)
BASOPHILS NFR BLD: 1 % (ref 0–2)
BILIRUB DIRECT SERPL-MCNC: <0.1 MG/DL (ref 0–0.2)
BILIRUB SERPL-MCNC: 0.2 MG/DL (ref 0.2–1)
BUN SERPL-MCNC: 19 MG/DL (ref 7–18)
BUN/CREAT SERPL: 26 (ref 12–20)
CALCIUM SERPL-MCNC: 9.5 MG/DL (ref 8.5–10.1)
CHLORIDE SERPL-SCNC: 107 MMOL/L (ref 100–111)
CO2 SERPL-SCNC: 26 MMOL/L (ref 21–32)
CREAT SERPL-MCNC: 0.73 MG/DL (ref 0.6–1.3)
DIFFERENTIAL METHOD BLD: ABNORMAL
EOSINOPHIL # BLD: 0.2 K/UL (ref 0–0.4)
EOSINOPHIL NFR BLD: 3 % (ref 0–5)
ERYTHROCYTE [DISTWIDTH] IN BLOOD BY AUTOMATED COUNT: 13.2 % (ref 11.6–14.5)
GLOBULIN SER CALC-MCNC: 3.1 G/DL (ref 2–4)
GLUCOSE SERPL-MCNC: 90 MG/DL (ref 74–99)
HCT VFR BLD AUTO: 36.8 % (ref 35–45)
HGB BLD-MCNC: 12.2 G/DL (ref 12–16)
IMM GRANULOCYTES # BLD AUTO: 0 K/UL (ref 0–0.04)
IMM GRANULOCYTES NFR BLD AUTO: 1 % (ref 0–0.5)
LYMPHOCYTES # BLD: 2.4 K/UL (ref 0.9–3.6)
LYMPHOCYTES NFR BLD: 40 % (ref 21–52)
MCH RBC QN AUTO: 30.3 PG (ref 24–34)
MCHC RBC AUTO-ENTMCNC: 33.2 G/DL (ref 31–37)
MCV RBC AUTO: 91.5 FL (ref 78–100)
MONOCYTES # BLD: 0.5 K/UL (ref 0.05–1.2)
MONOCYTES NFR BLD: 8 % (ref 3–10)
NEUTS SEG # BLD: 2.9 K/UL (ref 1.8–8)
NEUTS SEG NFR BLD: 48 % (ref 40–73)
NRBC # BLD: 0 K/UL (ref 0–0.01)
NRBC BLD-RTO: 0 PER 100 WBC
PLATELET # BLD AUTO: 342 K/UL (ref 135–420)
PMV BLD AUTO: 10.7 FL (ref 9.2–11.8)
POTASSIUM SERPL-SCNC: 3.9 MMOL/L (ref 3.5–5.5)
PROT SERPL-MCNC: 7.1 G/DL (ref 6.4–8.2)
RBC # BLD AUTO: 4.02 M/UL (ref 4.2–5.3)
SODIUM SERPL-SCNC: 141 MMOL/L (ref 136–145)
WBC # BLD AUTO: 6.1 K/UL (ref 4.6–13.2)

## 2022-12-19 PROCEDURE — G8427 DOCREV CUR MEDS BY ELIG CLIN: HCPCS | Performed by: NURSE PRACTITIONER

## 2022-12-19 PROCEDURE — 85025 COMPLETE CBC W/AUTO DIFF WBC: CPT

## 2022-12-19 PROCEDURE — 80048 BASIC METABOLIC PNL TOTAL CA: CPT

## 2022-12-19 PROCEDURE — 36415 COLL VENOUS BLD VENIPUNCTURE: CPT

## 2022-12-19 PROCEDURE — G9717 DOC PT DX DEP/BP F/U NT REQ: HCPCS | Performed by: NURSE PRACTITIONER

## 2022-12-19 PROCEDURE — 99213 OFFICE O/P EST LOW 20 MIN: CPT | Performed by: NURSE PRACTITIONER

## 2022-12-19 PROCEDURE — 80076 HEPATIC FUNCTION PANEL: CPT

## 2022-12-19 PROCEDURE — G8417 CALC BMI ABV UP PARAM F/U: HCPCS | Performed by: NURSE PRACTITIONER

## 2022-12-19 RX ORDER — OXCARBAZEPINE 600 MG/1
600 TABLET, FILM COATED ORAL
COMMUNITY

## 2022-12-19 RX ORDER — TOPIRAMATE 50 MG/1
50 TABLET, FILM COATED ORAL 2 TIMES DAILY
COMMUNITY

## 2022-12-19 RX ORDER — LEVOTHYROXINE SODIUM 100 UG/1
100 TABLET ORAL
COMMUNITY

## 2022-12-19 RX ORDER — ESZOPICLONE 3 MG/1
3 TABLET, FILM COATED ORAL
COMMUNITY

## 2022-12-19 RX ORDER — PREGABALIN 75 MG/1
75 CAPSULE ORAL 2 TIMES DAILY
COMMUNITY

## 2022-12-19 RX ORDER — PRAZOSIN HYDROCHLORIDE 5 MG/1
5 CAPSULE ORAL
COMMUNITY

## 2022-12-19 NOTE — PROGRESS NOTES
[address] [place] [address], Sony YOUSIF PA-C April S Ashworth, [facility]-BC   Ashkan James, [facility]-   Malina Le, FNBRINA Bonilla, FNBRINA Moeller, PCNP-BC      Rosalia [address]   at [address], [address] [place] [address], [facility] 22.   [phone]   FAX: [phone]   at [address], [address]   [phone]   FAX: [phone]       Patient Care Team:  Asia Gonzáles NP as PCP - General (Nurse Practitioner)  Karon Crawford MD (Gastroenterology)      Problem List  Date Reviewed: 6/30/2022            Codes Class Noted    Anxiety ICD-10-CM: F41.9  ICD-9-CM: 300.00  Unknown        Celiac disease ICD-10-CM: K90.0  ICD-9-CM: 579.0  Unknown        Chronic nausea ICD-10-CM: R11.0  ICD-9-CM: 787.02  Unknown        Chronic pain ICD-10-CM: G89.29  ICD-9-CM: 338.29  Unknown        NAFL (nonalcoholic fatty liver) NAVEEN-31-PM: K76.0  ICD-9-CM: 571.8  12/25/2019        Bladder wall thickening ICD-10-CM: N32.89  ICD-9-CM: 596.89  9/9/2019        Acquired hypothyroidism ICD-10-CM: E03.9  ICD-9-CM: 244.9  8/21/2019        DM type 2 (diabetes mellitus, type 2) ([facility] 75.) ICD-10-CM: E11.9  ICD-9-CM: 250.00  8/21/2019        Bipolar disorder ([facility] 75.) ICD-10-CM: F31.9  ICD-9-CM: 296.80  8/21/2019        Asthma ICD-10-CM: J45.909  ICD-9-CM: 493.90  8/21/2019        Gastroparesis ICD-10-CM: K31.84  ICD-9-CM: 536.3  8/21/2019        S/P cholecystectomy ICD-10-CM: Z90.49  ICD-9-CM: V45.79  8/21/2019        Dyslipidemia ICD-10-CM: E78.5  ICD-9-CM: 272.4  8/21/2019        Urge incontinence ICD-10-CM: N39.41  ICD-9-CM: 788.31  12/14/2017           Surekha Sun returns to the [address] for management of non-alcoholic fatty liver (NAFL).  The active problem list, all pertinent past medical history, medications, liver histology, radiologic findings and laboratory findings related to the liver disorder were reviewed with the patient. The patient is a 36 y.o. female who was found to have intermittent elevation in liver enzymes in 2015     Serologic evaluation for markers of chronic liver disease was negative     Ultrasound of the liver performed in 5/2019 demonstrated increased hepatic echogenicity consistent with fatty liver    A liver wedge biopsy performed during lap cholecystectomy in 9/2015 demonstrated moderate to severe steatosis and early periportal and bridging fibrosis. The patient underwent a liver biopsy in 10/2019. This demonstrates macrovesicular steatosis with no inflammation and no fibrosis. Assessment of liver fibrosis with Fibroscan was performed in 6/2021. The result was 3.0 kPa which correlates with no fibrosis. The CAP score of 321 suggests hepatic steatosis. The patient notes fatigues    The patient has not experienced the following symptoms: fatigue, pain in the right side over the liver,    yellowing of the eyes or skin, problems concentrating,     The patient completes all daily activities without any functional limitations. 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 The specific patient was not identified. She is now recieving prophylaxis against HIV. ASSESSMENT AND PLAN:  Benign steatosis/NAFL  The diagnosis is based upon liver biopsy, Fibroscan, features of metabolic syndrome, serologic studies that are negative for other causes of chronic liver disease,     A liver biopsy performed in 10/2019 shows macrovesicular steatosis with no inflammation and no fibrosis. Fibroscan in 6/2021 was 3.0kPa and  suggesting fatty liver with no fibrosis. NAFL is a benign form of fatty liver disease and not thought to progress to fibrosis or cirrhosis. Liver transaminases are normal.  ALP is normal.  Liver function is normal.  The platelet count is normal.      If the patient loses 20% of current body weight, which is 38 pounds, down to a weight of 150 pounds, all steatosis will have resolved. Once all steatosis has resolved all inflammation will resolve. Then all fibrosis will gradually resolve and the liver could eventually be normal.    Counseling for diet and weight loss in patients with confirmed or suspected NAFLD  The patient was counseled regarding diet and exercise to achieve weight loss. The best diet for patients with fatty liver is one very low in carbohydrates and enriched with protein such as an Jonathan's program.      The patient was told not to consume any food products and drinks containing fructose as this enhances hepatic fat synthesis. There is no medication or vitamin supplements that we advocate for DOMINGUEZ. Using glitazones in patients without diabetes mellitus has been shown to reduce fat content in the liver but has no effect on fibrosis and is associated with weight gain. Vitamin E has also been used but the data is not very good and most experts no longer advocate this. Screening for Hepatocellular Carcinoma  HCC screening is not necessary since the patient has no evidence of cirrhosis. Treatment of other medical problems in patients with chronic liver disease  There are no contraindications for the patient to take most medications that are necessary for treatment of other medical issues. Counseling for alcohol in patients with chronic liver disease  The patient was counseled regarding alcohol consumption and the effect of alcohol on chronic liver disease. The patient does not consume any significant amount of alcohol. Vaccinations   The need for vaccination against viral hepatitis A and B will be assessed with serologic and instituted as appropriate.   Routine vaccinations against other bacterial and viral agents can be performed as indicated. Annual flu vaccination should be administered if indicated. ALLERGIES  Allergies   Allergen Reactions    Horseradish Swelling    Amoxicillin Hives    Tramadol Hives    Kavitha-[place] Plus Other (comments)     Kavitha driscoll    Gives her hallucination type feelings, feeling disconnected                                                                                                                                                                             Horseradish givers her mouth swelling    Bactrim [Sulfamethoprim] Hives    Barium Iodide Nausea Only    Clindamycin Other (comments)     GI issues stated pt. Dextromethorphan-Guaifenesin Hives    Drug Class [Statins-Hmg-Coa Reductase Inhibitors] Other (comments)     DOMINGUEZ    Gluten Nausea and Vomiting    Levaquin [Levofloxacin] Other (comments)     Joint stiffness and pain    Sodium Citrate Dihydrate Other (comments)     Mental issues stated pt. Sulfamethoxazole Hives    Sulfamethoxazole-Trimethoprim Hives       MEDICATIONS  Current Outpatient Medications   Medication Sig    metFORMIN (GLUCOPHAGE) 500 mg tablet Take 500 mg by mouth two (2) times daily (with meals). levocetirizine (XYZAL) 5 mg tablet Take 5 mg by mouth daily. diclofenac (VOLTAREN) 1 % gel APPLY 2-4GMS EXTERNALLY 4 TIMES A DAY    promethazine (PHENERGAN) 25 mg tablet Take 25 mg by mouth every six (6) hours as needed for Nausea. fenofibrate nanocrystallized (TRICOR) 145 mg tablet Take 145 mg by mouth daily. albuterol (PROVENTIL HFA, VENTOLIN HFA, PROAIR HFA) 90 mcg/actuation inhaler 180 mcg every six (6) hours as needed. ondansetron hcl (ZOFRAN) 4 mg tablet Take 4 mg by mouth every eight (8) hours as needed for Nausea. meclizine (ANTIVERT) 25 mg tablet Take  by mouth three (3) times daily as needed. LORazepam (ATIVAN) 0.5 mg tablet 0.5 mg as needed. No current facility-administered medications for this visit.        SYSTEM REVIEW NOT RELATED TO LIVER DISEASE OR REVIEWED ABOVE:  Constitution systems: Negative for fever, chills, weight gain, weight loss. Eyes: Negative for visual changes. ENT: Negative for sore throat, painful swallowing. Respiratory: Negative for cough, hemoptysis, SOB. Cardiology: Negative for chest pain, palpitations. GI:  Negative for constipation or diarrhea. : Negative for urinary frequency, dysuria, hematuria, nocturia. Skin: Negative for rash. Hematology: Negative for easy bruising, blood clots. Musculo-skelatal: Negative for back pain, muscle pain, weakness. Neurologic: Negative for headaches, dizziness, vertigo, memory problems not related to HE. Psychology: Negative for anxiety, depression. FAMILY HISTORY:  The patient has no knowledge of the father's medical condition. The mother is . There is no family history of liver disease. The following family members have immune disorders: Mother had RA. SOCIAL HISTORY:  The patient is . The patient has 1 child. The patient currently smokes 1/2- 1  pack of tobacco daily. The patient very rarely drink alcohol. The patient has previously consumed alcohol in excess. The patient currently works part time in [employer] division of [employer]. The patient is currently receiving disability. PHYSICAL EXAMINATION:  Visit Vitals  /79   Pulse 77   Temp 97.3 °F (36.3 °C)   Ht 5' 4\" (1.626 m)   Wt 172 lb (78 kg)   SpO2 98%   BMI 29.52 kg/m²     General: No acute distress. Eyes: Sclera anicteric. ENT: No oral lesions. Thyroid normal.  Nodes: No adenopathy. Skin: No spider angiomata. No jaundice. No palmar erythema. Respiratory: Lungs clear to auscultation. Cardiovascular: Regular heart rate. No murmurs. No JVD. Abdomen: Soft non-tender. Liver size normal to percussion/palpation. Spleen not palpable. No obvious ascites. Extremities: No edema. No muscle wasting.   No gross arthritic changes. Neurologic: Alert and oriented. Cranial nerves grossly intact. No asterixis. LABORATORY STUDIES:  From 6/2022  AST/ALT/ALP/T Bili/ALB:  17/18/68/0.2/5.0  WBC/HB/PLT/INR:  6.0/13.0/338  NA/BUN/CREAT:  21/0.7    Liver [place] of [address] Units 12/19/2022   WBC 4.6 - 13.2 K/uL 6.1   ANC 1.8 - 8.0 K/UL 2.9   HGB 12.0 - 16.0 g/dL 12.2    - 420 K/uL 342   AST 10 - 38 U/L 14   ALT 13 - 56 U/L 22   Alk Phos 45 - 117 U/L 70   Bili, Total 0.2 - 1.0 MG/DL 0.2   Bili, Direct 0.0 - 0.2 MG/DL <0.1   Albumin 3.4 - 5.0 g/dL 4.0   BUN 7.0 - 18 MG/DL 19 (H)   Creat 0.6 - 1.3 MG/DL 0.73   Na 136 - 145 mmol/L 141   K 3.5 - 5.5 mmol/L 3.9   Cl 100 - 111 mmol/L 107   CO2 21 - 32 mmol/L 26   Glucose 74 - 99 mg/dL 90       SEROLOGIES:  5/2019. HBsAntigen negative, anti-HCV negative  3/2020. Anti-HBsurface positive    Serologies Latest Ref Rng & Units 8/21/2019   Hep B Surface Ag <1.00 Index 0.2   Hep B Surface Ag Interp NEG   NEGATIVE   Hep B Core Ab, Total NEGATIVE   NEGATIVE   Ferritin 8 - 388 NG/ML 54   Iron % Saturation % 10   JOURDAN, IFA  NEGATIVE   C-ANCA Neg:<1:20 titer <1:20   P-ANCA Neg:<1:20 titer <1:20   ANCA Neg:<1:20 titer <1:20   ASMCA 0 - 19 Units 8   M2 Ab 0.0 - 20.0 Units <20.0   Ceruloplasmin 19.0 - 39.0 mg/dL 34.2   Alpha-1 antitrypsin level 90 - 200 mg/dL 129     LIVER HISTOLOGY:  10/2019. Slides reviewed by MLS. NAFL. 66-75% mostly macrovesicualr and micovesicular steatosis, No inflammation, No ballooning, Stage No fibrosis. HOLLEY (300). 6/2021. [facility]. EkPa was 3.0. IQR/med 22%. . The results suggested a fibrosis level of F0. The CAP score suggests there is hepatic steatosis. ENDOSCOPIC PROCEDURES:  Not available or performed    RADIOLOGY:  5/2019: Liver US: increased hepatic echogenicity may suggest hepatic steatosis.     OTHER TESTING:  Not available or performed    FOLLOW-UP:  All of the issues listed above in the Assessment and Plan were discussed with the patient. All questions were answered. The patient expressed a clear understanding of the above. [facility] 87 in 6 months for monitoring and Fibroscan.        VIRIDIANA Mackay. Luis Angel Brooks [facility] [place] of [address], [facility] [address]  Avtar Rasheed, [address]  [phone]

## 2023-04-05 ENCOUNTER — TELEPHONE (OUTPATIENT)
Age: 43
End: 2023-04-05

## 2023-04-05 DIAGNOSIS — K76.0 NAFL (NONALCOHOLIC FATTY LIVER): Primary | ICD-10-CM

## 2023-06-22 ENCOUNTER — OFFICE VISIT (OUTPATIENT)
Age: 43
End: 2023-06-22
Payer: COMMERCIAL

## 2023-06-22 ENCOUNTER — HOSPITAL ENCOUNTER (OUTPATIENT)
Facility: HOSPITAL | Age: 43
Setting detail: SPECIMEN
Discharge: HOME | End: 2023-06-25

## 2023-06-22 VITALS
BODY MASS INDEX: 30.87 KG/M2 | OXYGEN SATURATION: 98 % | RESPIRATION RATE: 18 BRPM | TEMPERATURE: 98.4 F | DIASTOLIC BLOOD PRESSURE: 65 MMHG | WEIGHT: 180.8 LBS | HEART RATE: 78 BPM | SYSTOLIC BLOOD PRESSURE: 100 MMHG | HEIGHT: 64 IN

## 2023-06-22 DIAGNOSIS — K76.0 NAFL (NONALCOHOLIC FATTY LIVER): Primary | ICD-10-CM

## 2023-06-22 LAB — LABCORP SPECIMEN COLLECTION: NORMAL

## 2023-06-22 PROCEDURE — 99213 OFFICE O/P EST LOW 20 MIN: CPT | Performed by: NURSE PRACTITIONER

## 2023-06-22 PROCEDURE — 91200 LIVER ELASTOGRAPHY: CPT | Performed by: NURSE PRACTITIONER

## 2023-06-22 RX ORDER — MIRTAZAPINE 15 MG/1
TABLET, FILM COATED ORAL
COMMUNITY
Start: 2023-06-16

## 2023-06-23 LAB
ALBUMIN SERPL-MCNC: 4.1 G/DL (ref 3.8–4.8)
ALP SERPL-CCNC: 52 IU/L (ref 44–121)
ALT SERPL-CCNC: 11 IU/L (ref 0–32)
AST SERPL-CCNC: 15 IU/L (ref 0–40)
BASOPHILS # BLD AUTO: 0.1 X10E3/UL (ref 0–0.2)
BASOPHILS NFR BLD AUTO: 1 %
BILIRUB DIRECT SERPL-MCNC: <0.1 MG/DL (ref 0–0.4)
BILIRUB SERPL-MCNC: <0.2 MG/DL (ref 0–1.2)
BUN SERPL-MCNC: 18 MG/DL (ref 6–24)
BUN/CREAT SERPL: 25 (ref 9–23)
CHLORIDE SERPL-SCNC: 107 MMOL/L (ref 96–106)
CO2 SERPL-SCNC: 23 MMOL/L (ref 20–29)
CREAT SERPL-MCNC: 0.72 MG/DL (ref 0.57–1)
EGFRCR SERPLBLD CKD-EPI 2021: 106 ML/MIN/1.73
EOSINOPHIL # BLD AUTO: 0.2 X10E3/UL (ref 0–0.4)
EOSINOPHIL NFR BLD AUTO: 3 %
ERYTHROCYTE [DISTWIDTH] IN BLOOD BY AUTOMATED COUNT: 13.1 % (ref 11.7–15.4)
GLUCOSE SERPL-MCNC: 100 MG/DL (ref 70–99)
HCT VFR BLD AUTO: 37.9 % (ref 34–46.6)
HGB BLD-MCNC: 12.8 G/DL (ref 11.1–15.9)
IMM GRANULOCYTES # BLD AUTO: 0 X10E3/UL (ref 0–0.1)
IMM GRANULOCYTES NFR BLD AUTO: 0 %
LYMPHOCYTES # BLD AUTO: 2 X10E3/UL (ref 0.7–3.1)
LYMPHOCYTES NFR BLD AUTO: 39 %
MCH RBC QN AUTO: 30.3 PG (ref 26.6–33)
MCHC RBC AUTO-ENTMCNC: 33.8 G/DL (ref 31.5–35.7)
MCV RBC AUTO: 90 FL (ref 79–97)
MONOCYTES # BLD AUTO: 0.4 X10E3/UL (ref 0.1–0.9)
MONOCYTES NFR BLD AUTO: 8 %
NEUTROPHILS # BLD AUTO: 2.4 X10E3/UL (ref 1.4–7)
NEUTROPHILS NFR BLD AUTO: 49 %
PLATELET # BLD AUTO: 331 X10E3/UL (ref 150–450)
POTASSIUM SERPL-SCNC: 4.3 MMOL/L (ref 3.5–5.2)
PROT SERPL-MCNC: 6.9 G/DL (ref 6–8.5)
RBC # BLD AUTO: 4.23 X10E6/UL (ref 3.77–5.28)
SODIUM SERPL-SCNC: 144 MMOL/L (ref 134–144)
SPECIMEN STATUS REPORT: NORMAL
WBC # BLD AUTO: 5 X10E3/UL (ref 3.4–10.8)

## 2024-06-24 ENCOUNTER — OFFICE VISIT (OUTPATIENT)
Age: 44
End: 2024-06-24
Payer: COMMERCIAL

## 2024-06-24 VITALS
WEIGHT: 185 LBS | SYSTOLIC BLOOD PRESSURE: 109 MMHG | HEART RATE: 96 BPM | OXYGEN SATURATION: 99 % | DIASTOLIC BLOOD PRESSURE: 83 MMHG | TEMPERATURE: 97.3 F | BODY MASS INDEX: 31.58 KG/M2 | HEIGHT: 64 IN

## 2024-06-24 DIAGNOSIS — K76.0 NAFL (NONALCOHOLIC FATTY LIVER): Primary | ICD-10-CM

## 2024-06-24 PROCEDURE — 99214 OFFICE O/P EST MOD 30 MIN: CPT | Performed by: NURSE PRACTITIONER

## 2024-06-24 PROCEDURE — 91200 LIVER ELASTOGRAPHY: CPT | Performed by: NURSE PRACTITIONER

## 2024-06-24 RX ORDER — SUCRALFATE 1 G/1
TABLET ORAL
COMMUNITY
Start: 2024-06-19 | End: 2024-06-25

## 2024-06-24 RX ORDER — CYCLOBENZAPRINE HCL 5 MG
5 TABLET ORAL 3 TIMES DAILY PRN
COMMUNITY
End: 2024-06-25

## 2024-06-24 RX ORDER — OXYCODONE HYDROCHLORIDE AND ACETAMINOPHEN 5; 325 MG/1; MG/1
1-2 TABLET ORAL
COMMUNITY
Start: 2021-03-24 | End: 2024-06-25

## 2024-06-24 RX ORDER — METHOCARBAMOL 750 MG/1
750 TABLET, FILM COATED ORAL 4 TIMES DAILY
COMMUNITY
End: 2024-06-25

## 2024-06-24 RX ORDER — PANTOPRAZOLE SODIUM 20 MG/1
20 TABLET, DELAYED RELEASE ORAL DAILY
COMMUNITY
Start: 2023-12-29 | End: 2024-06-25

## 2024-06-24 RX ORDER — BUDESONIDE 3 MG/1
6 CAPSULE, COATED PELLETS ORAL EVERY MORNING
COMMUNITY
End: 2024-06-25

## 2024-06-24 RX ORDER — ONDANSETRON 4 MG/1
TABLET, ORALLY DISINTEGRATING ORAL
COMMUNITY
Start: 2024-06-19

## 2024-06-24 RX ORDER — SUMATRIPTAN 100 MG/1
100 TABLET, FILM COATED ORAL DAILY PRN
COMMUNITY
Start: 2023-08-09

## 2024-06-24 RX ORDER — BACLOFEN 10 MG/1
TABLET ORAL
COMMUNITY
Start: 2021-03-01 | End: 2024-06-25

## 2024-06-24 RX ORDER — ONABOTULINUMTOXINA 200 [USP'U]/1
INJECTION, POWDER, LYOPHILIZED, FOR SOLUTION INTRADERMAL; INTRAMUSCULAR
COMMUNITY
Start: 2024-06-23

## 2024-06-24 RX ORDER — CYCLOBENZAPRINE HCL 10 MG
10 TABLET ORAL EVERY 8 HOURS PRN
COMMUNITY
Start: 2022-04-04

## 2024-06-24 RX ORDER — LORAZEPAM 0.5 MG/1
1 TABLET ORAL
COMMUNITY
Start: 2019-09-07 | End: 2024-06-25

## 2024-06-24 RX ORDER — FLUTICASONE FUROATE, UMECLIDINIUM BROMIDE AND VILANTEROL TRIFENATATE 100; 62.5; 25 UG/1; UG/1; UG/1
POWDER RESPIRATORY (INHALATION) DAILY
COMMUNITY
Start: 2020-11-04 | End: 2024-06-25

## 2024-06-24 RX ORDER — TIZANIDINE 4 MG/1
8 TABLET ORAL
COMMUNITY
Start: 2020-02-20 | End: 2024-06-25

## 2024-06-24 RX ORDER — PANTOPRAZOLE SODIUM 40 MG/1
40 TABLET, DELAYED RELEASE ORAL 2 TIMES DAILY
COMMUNITY

## 2024-06-24 RX ORDER — DICYCLOMINE HYDROCHLORIDE 10 MG/1
10 CAPSULE ORAL
COMMUNITY
Start: 2020-12-07 | End: 2024-06-25

## 2024-06-24 RX ORDER — LORAZEPAM 1 MG/1
TABLET ORAL
COMMUNITY
Start: 2020-10-26 | End: 2024-06-25

## 2024-06-24 RX ORDER — NAPROXEN 500 MG/1
500 TABLET ORAL 2 TIMES DAILY WITH MEALS
COMMUNITY
End: 2024-06-25

## 2024-06-24 RX ORDER — FLUTICASONE PROPIONATE 50 MCG
SPRAY, SUSPENSION (ML) NASAL
COMMUNITY
Start: 2018-12-05

## 2024-06-24 RX ORDER — FLUTICASONE FUROATE AND VILANTEROL 100; 25 UG/1; UG/1
POWDER RESPIRATORY (INHALATION)
COMMUNITY
Start: 2022-11-03 | End: 2024-06-25

## 2024-06-24 RX ORDER — PANTOPRAZOLE SODIUM 20 MG/1
1 TABLET, DELAYED RELEASE ORAL 2 TIMES DAILY
COMMUNITY
Start: 2023-01-04 | End: 2024-06-25

## 2024-06-24 RX ORDER — METHYLPREDNISOLONE 4 MG/1
TABLET ORAL
COMMUNITY
Start: 2023-11-13

## 2024-06-24 RX ORDER — PREDNISONE 20 MG/1
TABLET ORAL
COMMUNITY
Start: 2023-06-15 | End: 2024-06-25

## 2024-06-24 RX ORDER — FLUTICASONE PROPIONATE 44 UG/1
1 AEROSOL, METERED RESPIRATORY (INHALATION)
COMMUNITY

## 2024-06-24 RX ORDER — FAMOTIDINE 20 MG/1
20 TABLET, FILM COATED ORAL 2 TIMES DAILY
COMMUNITY
Start: 2020-10-07 | End: 2024-06-25

## 2024-06-24 RX ORDER — ACETAMINOPHEN 500 MG
1000 TABLET ORAL
COMMUNITY
Start: 2021-12-13

## 2024-06-24 RX ORDER — ALPRAZOLAM 1 MG/1
1 TABLET ORAL
COMMUNITY
End: 2024-06-25

## 2024-06-24 RX ORDER — AMOXICILLIN AND CLAVULANATE POTASSIUM 875; 125 MG/1; MG/1
TABLET, FILM COATED ORAL
COMMUNITY
Start: 2021-03-25 | End: 2024-06-25

## 2024-06-24 RX ORDER — AZITHROMYCIN 250 MG/1
TABLET, FILM COATED ORAL
COMMUNITY
Start: 2021-09-05 | End: 2024-06-25

## 2024-06-24 RX ORDER — PSEUDOEPHEDRINE HCL 30 MG
TABLET ORAL
COMMUNITY

## 2024-06-24 RX ORDER — SENNOSIDES A AND B 8.6 MG/1
1 TABLET, FILM COATED ORAL DAILY
COMMUNITY
End: 2024-06-25

## 2024-06-24 RX ORDER — ZOLPIDEM TARTRATE 5 MG/1
5 TABLET ORAL NIGHTLY PRN
COMMUNITY
Start: 2021-03-24 | End: 2024-06-25

## 2024-06-24 RX ORDER — LIDOCAINE 50 MG/G
OINTMENT TOPICAL
COMMUNITY

## 2024-06-24 RX ORDER — PALIPERIDONE 3 MG/1
3 TABLET, EXTENDED RELEASE ORAL EVERY MORNING
COMMUNITY
End: 2024-06-25

## 2024-06-24 RX ORDER — BIFIDOBACTERIUM LONGUM SUBSP. INFANTIS, AVOBENZONE, HOMOSALATE, OCTISALATE, OCTOCRYLENE, AND OXYBENZONE
2 KIT DAILY
COMMUNITY

## 2024-06-24 RX ORDER — ENEMA 19; 7 G/133ML; G/133ML
ENEMA RECTAL
COMMUNITY
Start: 2021-03-25 | End: 2024-06-25

## 2024-06-24 NOTE — PROGRESS NOTES
[facility]      Alvino Castaneda MD, FACP, FACG, FAASLD      Lynne Batista, PA-C    Mary Rush, [facility]   Yuliana Lloydrebecca, [facility]   Earlenezach Weston, [facility]-  Aniceto Alcantara, [facility]   Juanis Melendez, [facility]   Rosa Aston, [facility]   [address], [address]   [place]  [address] [phone]   FAX: [phone]  [facility]   [address], [address]   [place]  [address] [phone]   FAX: [phone]     Patient Care Team:  Amauri Parmar NP as PCP - General (Nurse Practitioner)  Peter Jenkins MD (Gastroenterology)    Problem List  Date Reviewed: 6/30/2022     Patient Active Problem List   Diagnosis    Acquired hypothyroidism    Gastroparesis    Bipolar disorder (HCC)    Dyslipidemia    Bladder wall thickening    DM type 2 (diabetes mellitus, type 2) (HCC)    S/P cholecystectomy    Asthma    Urge incontinence    NAFL (nonalcoholic fatty liver)    Anxiety    Celiac disease    Chronic nausea    Chronic pain     Roxy Tai returns to the Liver [facility] for management of non-alcoholic fatty liver (NAFL). The active problem list, all pertinent past medical history, medications, liver histology, radiologic findings and laboratory findings related to the liver disorder were reviewed with the patient.      The patient is a 44 y.o. female who was found to have intermittent elevation in liver enzymes in 2015     Serologic evaluation for markers of chronic liver disease was negative     Ultrasound of the liver performed in 5/2019 demonstrated increased hepatic echogenicity consistent with fatty liver    A liver wedge biopsy performed during lap cholecystectomy in 9/2015 demonstrated moderate to severe steatosis and early periportal and bridging fibrosis.     The patient

## (undated) DEVICE — SPONGE GZ W4XL4IN COT 12 PLY TYP VII WVN C FLD DSGN

## (undated) DEVICE — HYPODERMIC SAFETY NEEDLE: Brand: MAGELLAN

## (undated) DEVICE — (D)BNDG ADHESIVE FABRIC 3/4X3 -- DISC BY MFR USE ITEM 357960

## (undated) DEVICE — SOL IRRIGATION INJ NACL 0.9% 500ML BTL

## (undated) DEVICE — KENDALL RADIOLUCENT FOAM MONITORING ELECTRODE RECTANGULAR SHAPE: Brand: KENDALL

## (undated) DEVICE — TRAY PREP DRY W/ PREM GLV 2 APPL 6 SPNG 2 UNDPD 1 OVERWRAP

## (undated) DEVICE — PAD NON-ADHERENT 3X4 STRL LF --

## (undated) DEVICE — (D)SYR 10ML 1/5ML GRAD NSAF -- PKGING CHANGE USE ITEM 338027

## (undated) DEVICE — SKIN MARKER,REGULAR TIP WITH RULER AND LABELS: Brand: DEVON

## (undated) DEVICE — MAYO STAND COVER: Brand: CONVERTORS

## (undated) DEVICE — DRAPE,APERTURE,MINOR PROCEDURE: Brand: MEDLINE

## (undated) DEVICE — INTENDED FOR TISSUE SEPARATION, AND OTHER PROCEDURES THAT REQUIRE A SHARP SURGICAL BLADE TO PUNCTURE OR CUT.: Brand: BARD-PARKER ®  SAFETY SCALPED

## (undated) DEVICE — MAX-CORE® DISPOSABLE CORE BIOPSY INSTRUMENT, 16G X 16CM: Brand: MAX-CORE

## (undated) DEVICE — MAJ-1414 SINGLE USE ADPATER BIOPSY VALV: Brand: SINGLE USE ADAPTOR BIOPSY VALVE